# Patient Record
(demographics unavailable — no encounter records)

---

## 2025-05-09 NOTE — ASSESSMENT
[FreeTextEntry1] : dementia: moderate dementia. suspect Alzheimers dementia did not tolerate Aricept cw namenda to 28mg FAST score 6b  depression: stable c/w mirtazepine 15mg qhs  poor balance: start PT at home  HTN:uncontrolled increase norvasc to 10mg daily  HLD: c/w statin,repeat labwork  OP: hx of taking fosamax for ~5 years in past.  pre dm2: a1c 5.6% 3/2022 repeat in setting of weight gain .

## 2025-05-09 NOTE — PHYSICAL EXAM
[Alert] : alert [Sclera] : the sclera and conjunctiva were normal [EOMI] : extraocular movements were intact [Normal Outer Ear/Nose] : the ears and nose were normal in appearance [Normal] : the appearance was normal, neck was supple [No Respiratory Distress] : no respiratory distress [No Acc Muscle Use] : no accessory muscle use [Respiration, Rhythm And Depth] : normal respiratory rhythm and effort [Auscultation Breath Sounds / Voice Sounds] : lungs were clear to auscultation bilaterally [Normal S1, S2] : normal S1 and S2 [Heart Rate And Rhythm] : heart rate was normal and rhythm regular [Edema] : edema was not present [Abdomen Tenderness] : non-tender [Abdomen Soft] : soft [No Clubbing, Cyanosis] : no clubbing or cyanosis of the fingernails [Involuntary Movements] : no involuntary movements were seen [Normal Color / Pigmentation] : normal skin color and pigmentation [No Focal Deficits] : no focal deficits [Normal Affect] : the affect was normal [Normal Mood] : the mood was normal [de-identified] : unsteady gait, poor balance

## 2025-05-09 NOTE — HISTORY OF PRESENT ILLNESS
[PMH Reviewed and Updated] : past medical history reviewed and updated [PSH Reviewed and Updated] : past surgical history reviewed and updated [Family History Reviewed and Updated] : family history reviewed and updated [Medication and Allergies Reconciled] : medication and allergies reconciled [0] : 0 [Retired] : retired from work [Smoking Status Reviewed and Updated] : Smoking status was reviewed and updated [None] : The patient has no concerns about alcohol abuse [Never] : has never used illicit drugs [Compliant with medications] : compliant with medications [Adequate] : adequate [Children] : children [Needs some help with ADLs] : need some help with ADLs [Does not drive] : does not drive [No history of falls] : no history of falls [Smoke Detectors] : smoke detectors [Carbon Monoxide Detector] : carbon monoxide detector [Bathroom Grab Bars] : bathroom grab bars [Fall Prevention Measures] : fall prevention measures [Patient Has Designated Health Care Proxy/Advanced Directive] : patient has designated Health Care Proxy/Advanced Directive [de-identified] : depression [FreeTextEntry1] : 82yof with pmhx of htn, hld, op, and dementia p/w with son for follow up Pashto speaking  dementia: moderate level was on aricept but developed nightmares, so it was stopped has been tolerating namenda medication: son fills pill box weekly, and then he reminds her and the aide reminds her no delusions, hallucinations episodes of no recognizing family members episodes of wanting to leave the house episodes of having BM in trashcan  functional status: no falls no assistive device worse walking and balance cameras in home for safety  lives alone HHA 7 days   a week  goes to [employer] with her HHA  Meals: HHA assists doesn't cook  needs help with dressing and supervision showering son lives in [place] other son lives in [place]  sleeping through night without issues hx of Breast cancer in 1992 s/p right mastectomy glasses: follows with ophthal: with narrow angle and glaucoma, with drops with improvement, follow up regularly no hearing loss   ACP: HCP is Johnathon Ivan , son [phone] [No falls in past year] : Patient reported no falls in the past year [Independent] : toileting [] : Patient is [place]. [Completely Dependent] : Completely dependent. [Other reason not done] : Other reason not done [de-identified] : depression diagnosed on treatment

## 2025-07-29 NOTE — HISTORY OF PRESENT ILLNESS
[Home] : at home, [unfilled] , at the time of the visit. [Medical Office: ([facility])___] : at the medical office located in  [Telehealth (audio & video)] : This visit was provided via telehealth using real-time 2-way audio visual technology. [FreeTextEntry3] : fausto Diego [FreeTextEntry1] : 82yof with pmhx of htn, hld, op, and dementia who is seen for worsening anxiety in dementia.  dementia: moderate level was on aricept but developed nightmares, so it was stopped has been tolerating namenda worse anxiety during night that occurs most nights a week-  where patient repeatively wants to go home, crying, and unable to redirect. in last 3 weeks, son reports she has left her apartment 10 times in the middle of night.  currently only has aide 70 hrs a week-  10 hrs a day for 7 days a week.  also with some hesitency/anxiety during day towards personal care   functional status: no falls no assistive device worse walking and balance cameras in home for safety  goes to [employer] with her HHA  Meals: HHA assists doesn't cook  needs help with dressing and supervision showering son lives in [place] other son lives in [place]  hx of Breast cancer in 1992 s/p right mastectomy glasses: follows with ophthal: with narrow angle and glaucoma, with drops with improvement, follow up regularly no hearing loss   ACP: HCP is Beckievaleriy Marzena , son [phone]

## 2025-07-29 NOTE — ASSESSMENT
[FreeTextEntry1] : anxiety: uncontrolled safety concern given leaving house in middle of night son has cameras, but she is undirectable -discussed risks/alternatives of seroquel including black box warning. plan to start low dose seroquel of 25mg qhs and monitor symptoms recommend patient increase HHA from current 70hrs week, to include night time coverage with HHA given these new changes.  dementia: moderate dementia. suspect Alzheimers dementia did not tolerate Aricept cw namenda to 28mg FAST score 6b  depression: stable c/w mirtazepine 15mg qhs  f/u  2 weeks with TH for response to seroquel